# Patient Record
Sex: MALE | Race: AMERICAN INDIAN OR ALASKA NATIVE
[De-identification: names, ages, dates, MRNs, and addresses within clinical notes are randomized per-mention and may not be internally consistent; named-entity substitution may affect disease eponyms.]

---

## 2017-09-06 ENCOUNTER — HOSPITAL ENCOUNTER (EMERGENCY)
Dept: HOSPITAL 46 - ED | Age: 47
Discharge: HOME | End: 2017-09-06
Payer: COMMERCIAL

## 2017-09-06 VITALS — WEIGHT: 250 LBS | BODY MASS INDEX: 33.86 KG/M2 | HEIGHT: 72 IN

## 2017-09-06 DIAGNOSIS — S01.112A: Primary | ICD-10-CM
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
## 2017-09-06 PROCEDURE — 0HQ1XZZ REPAIR FACE SKIN, EXTERNAL APPROACH: ICD-10-PCS

## 2018-06-16 ENCOUNTER — HOSPITAL ENCOUNTER (INPATIENT)
Dept: HOSPITAL 46 - ED | Age: 48
LOS: 4 days | Discharge: HOME | DRG: 638 | End: 2018-06-20
Attending: INTERNAL MEDICINE | Admitting: INTERNAL MEDICINE
Payer: COMMERCIAL

## 2018-06-16 VITALS — WEIGHT: 225 LBS | HEIGHT: 75 IN | BODY MASS INDEX: 27.98 KG/M2

## 2018-06-16 DIAGNOSIS — F10.20: ICD-10-CM

## 2018-06-16 DIAGNOSIS — D69.6: ICD-10-CM

## 2018-06-16 DIAGNOSIS — Z79.2: ICD-10-CM

## 2018-06-16 DIAGNOSIS — E11.10: Primary | ICD-10-CM

## 2018-06-16 DIAGNOSIS — D64.9: ICD-10-CM

## 2018-06-16 DIAGNOSIS — Z79.899: ICD-10-CM

## 2018-06-16 DIAGNOSIS — B37.0: ICD-10-CM

## 2018-06-16 DIAGNOSIS — F17.200: ICD-10-CM

## 2018-06-16 DIAGNOSIS — Z79.84: ICD-10-CM

## 2018-06-16 DIAGNOSIS — K70.0: ICD-10-CM

## 2018-06-16 DIAGNOSIS — E87.6: ICD-10-CM

## 2018-06-16 DIAGNOSIS — E83.42: ICD-10-CM

## 2018-06-16 DIAGNOSIS — E78.2: ICD-10-CM

## 2018-06-16 PROCEDURE — G0480 DRUG TEST DEF 1-7 CLASSES: HCPCS

## 2018-06-17 NOTE — NUR
CALLED MD JIMÉNEZ TO UPDATE REGUARDING PTS LABS. LAB VAULES HAVE NOT CROSSED
OVER TO [facility] AT THIS TIME.  READ VALUES TO STAFF VIA PHONE. PER MD
WHEN BS <200 CHANGE IV FLUIDS TO D5NS W/20K AT 200MLS/HR. NO OTHER CHANGES AT
THIS TIME. WILL CONTINUE TO CLOSELY MONITOR.

## 2018-06-17 NOTE — NUR
PER ORDETS LEFT INSULINE GTT THE SAME RATE. PT COMPLAINS OF MILD
NAUSEA. UPDATED MD REGUARDING PT STATUS AND CURRENT LABS. WILL CONTINUE TO
CLOSELY MONITOR.

## 2018-06-17 NOTE — NUR
FSBS 196, INSULIN GTT TITRATED TO 11.2 UNITS/HR. PT C/O NAUSEA, NEST DOSE OF
ZOFRAN NOT DUE AT THIS TIME, PROIVER NOTIFIED, NEW ORDERS TO BE OBTAINED.

## 2018-06-17 NOTE — NUR
INSULIN GTT CONTINUES PER PROTOCOL. D5NS WITH 20 MEQ K CONTINUOUS 
ML/HR. LAC AND RAC IV SITE WNL, FLUSHING WELL, DRESSINGS INTACT. PT A&O X4,
SLOW TO RESPOND, FLAT AFFECT NOTED, BUT PLEASANT AND RECEPTIVE TO EDUCATION.
HR HAS REMAINED IN THE 70-80'S AND IN SR. LUNGS HAVE REMAIN CLEAR THROUGHOUT
BILATERALLY, OCCASIONAL COUGH WITH THIN GREEN SPUTUM. PT C/O NAUSEA W/O
EMESIS. PT ON CLEAR LIQUID SUGAR FREE DIET AND TOELERATING WELL. URINE OUTPUT
ADEQUATE, NO BM. PT C/O OF HA ON AND OFF THROUGHOUT SHIFT, PRN TYLENOL GIVEN
WITH RELIEF. OVERALL, PT RESTED MOST OF THE DAY, PT ENCOURAGED TO INCEASE
ACTIVITY AS TOLERATED, EDUCATION POVIDED THROUGHOUT SHIFT.

## 2018-06-17 NOTE — NUR
PT ARRIVED TO CCU VIA STRETCHER WITH FLOAT NURSE AND SIGNIFICANT OTHER AT
SIDE. PT IS ALERT AND ANSWERS QUESTIONS. PT ABLE TO TRANSFER FROM STRETCHER TO
BED ON HIS OWN. PT IS FORGETFUL PER REPORT.

## 2018-06-17 NOTE — NUR
FSBS 168, INSULIN GTT TITRATED TO 8.4 PER PROTOCOL. PT RESTING COMFORTABLY IN
BED, DENIES PAIN OR NAUSEA. PROVIDED EDUCATION REGARDING NEW LAB RESULTS.
ADVISED PT TO INCEASE ACTIVITY AS TOLERATED, PT AGREEABLE. CALL LIGHT WITHIN
REACH. WILL CONTINUE TO MONITOR.

## 2018-06-17 NOTE — NUR
PT SHIFT REPORT RECEIVED FROM DAY SHIFT RN. PT IS RESTING IN CHAIR AT THIS
TIME WITH HIS SO IN THE ROOM. PT IS ALERT AND ORIENTED AND MORE INVOLVED IN
CONVERSATIONS THAN THE PREVIOUS NIGHT. PT STATES "I FEEL MUCH BETTER THAN WHEN
I FIRST CAME IN". PT  TITRATED INSULIN GTT TO 9.6MLS/HR PER ORDERS. PT
DRINKING CLEAR LIQUID AT THIS TIME. WILL CONTINUE TO CLOSELY MONITOR.

## 2018-06-17 NOTE — NUR
FSBS 199, PER INSULIN PROTOCOL, INSULIN GTT TO REMAIN AT 7 UNITS/HR. PT
ASSESSED FOR HA AND NAUSEA. PT REPORTS IMPROVEMENT IN HA, RATES 2/10, AND
DENIES NAUSEA.

## 2018-06-17 NOTE — NUR
PT RESTING COMFORTABLY IN BED. FSBS 166, INSULIN DRIP TITRATED TO 7.2
UNITS/HR. D5NS WITH 20 MEQ K CONTINUES AT A RATE  ML/HR. NO NAUSEA OR
PAIN REPORTED AT THIS TIME.

## 2018-06-17 NOTE — NUR
HOURLY BS OBTAINED AND NOTED TO . PER INSULIN PROTOCOL, INSULIN GTT
TITRATED DOWN TO 4.8 UNITS/HOUR. WILL CONTINUE TO MONITOR AND TITRATE AS
NEEDED.

## 2018-06-17 NOTE — NUR
REPORT RC'D FROM NIGHT SHIFT. INSULIN GTT AND IV FLUIDS VERIFIED WITH NIGHT
SHIFT NURSE. D5NS WITH 20 MEQ K RUNNING  ML/HR AND INSULIN GTT RUNNING
AT 5.6 UNITS/HOUR. PT RESTING COMFORTABLY IN BED, NO DISTRESS NOTED, FSBS TO
BE OBTAINED.

## 2018-06-17 NOTE — NUR
FSBS 204, INSULIN GTT TITRATED TO 7 UNITS/HOUR, PER INSULIN PROTOCOL. PT C/O
HA 7/10 AND NAUSEA,  MG TYLENOL AND 4 MG ZOFRAN GIVEN, WILL REASSESS.

## 2018-06-17 NOTE — NUR
FSBS 158, INSULIN GTT TITRATED TO 6 UNITS/HR. D5NS 20 MEQ K CONTINUOUSLY
RUNNING  ML/HR. PT ASSISTED TO CHAIR, TOLERATED WELL, STATES IMPROVEMENT
IN DIZZINESS. PERSONAL HYGIENE ADDRESSED AND PT ASSISTED WITH ADLS. ORAL CARE
DISCUSSED. LINEN CHANGED. CALL LIGHT WITHIN REACH. WILL CONTINUE TO MONITOR.

## 2018-06-17 NOTE — NUR
INSULIN GTT DC'D PER ORDER. CONTINUOUS FLUIDS DC'D PER MD. BLOOD SUGAR CHECKED
AND SUB-Q INSULIN GIVEN. PT DIET INCREASED TO ADA DIET AND PT REQUESTED A
SMALL SNACK. GAVE SNACK AND FRECH WATER AND TEA AT BEDSIDE. PT DENIES ANY
OTHER NEEDS AT THIS TIME. PT REMAINS AWAKE AND IN CHAIR. PT CALLS
APPROPRIATELY AND HAS SO AT BEDSIDE. WILL CONTINUE TO CLOSELY MONITOR AT THIS
TIME.

## 2018-06-17 NOTE — NUR
PT  PER ORDER PARAMETERS CHANGED GTT TO 6.8MLS/HR. WILL RECHECK IN 1
HOUR. 2ND RN SANJEEV VERIFIED GTT RATE. PT DENIES ANY NEEDS AT THIS TIME. WILL
CONTINUE TO CLOSELY MONITOR.

## 2018-06-17 NOTE — NUR
PT CALLED TO USE URINAL. PT URINATE 550MLS. PT BLOOD SUGAR CHECKED 234 INSULIN
GTT CHANGED TO 5.1MLS/HR PER ORDERS. SANJEEV LEON VERIFIED. PT DENIES ANY OTHER
NEEDS AT THIS TIME. WILL CONTINUE TO CLOSELY MONITOR.

## 2018-06-17 NOTE — NUR
PER MD TRANSITION PT TO SUB Q INSULINE. GIVE SUB Q LANTUS AND TURN INSULIN GTT
OFF IN 1 HOUR. PT EDUCATED REGUARDING PLAN AND HAS NO FURTHER QUESTIONS. WILL
CONTINUE TO CLOSELY MONITOR.

## 2018-06-17 NOTE — NUR
ALL MEDICATIONS ADMINISTERED. PT IS RESTING IN BED AT THIS TIME.  PER
TITRATION SCALE TURNED GTT TO 9MLS/HR. WILL RECHECK IN 1 HOUR. PT DENIES ANY
OTHER NEEDS AT THIS TIME. BED ALARM REMAINS IN PLACE FOR PATIENT SAFETY. PT SO
IS STAYING OVERNIGHT. WILL CONTINUE TO CLOSELY MONITOR.

## 2018-06-17 NOTE — NUR
PT BLOOD SUGAR 368 TITRATED PER INSULIN GTT PER COLUMN SCALE PROTOCOL PER MDS
ORDERS. INSULIN GTT AT 6MLS/HR. PT ADMITTED AND RESTING IN BED AT THIS TIME.
PT ASSESSMENT COMPLETED. PT BREATH SOUNDS CLEAR. BOWEL TONES ACTIVE. PT DENIES
ABD PAIN AT THIS TIME. PT HAS GENERALIZED WEAKNESS. PT IS SLOW TO RESPOND AND
FORGETFUL. SO JAYNETTE AT BEDSIDE AND ANSWERD QUESTIONS PATIENT WAS NOT ABLE
TO ANSWER ON HIS OWN. EDUCATED BOTH REGUARDING PLAN OF CARE FOR THE NIGHT.
CALL LIGHT AT BEDSIDE. BED ALARM ON FOR SAFETY. URINAL AT BEDSIDE. WILL
CONTINUE TO CLOSELY MONITOR.

## 2018-06-18 NOTE — NUR
PT IN BED, FAMILY BY HIS SIDE. IMAGING HAD JUST LEFT, FELT HE DID NOT NEED TO
BE BOTHERED. JUST SAID HELLO, INTRO MYSELF, AND TOLD HIM HE HAS HAD ENOUGH OF
US FOR NOW-HE KIND OF KNODDED. HE REACHED OUT HIS HAND TO SHAKE MINE, WILL
FOLLOW AS NEEDED. GOD BLESS HIM

## 2018-06-18 NOTE — NUR
PT  GAVE SLIDING SCALE INSULIN. PT REQUESTING SUGAR FREE PUDDING FOR
SNACK. FRESH WATER AT BEDSIDE. PT DENIES ANY OTHER NEEDS AT THIS TIME. WILL
CONTINUE TO CLOSELY MONITOR.

## 2018-06-18 NOTE — NUR
DINNER TIME APPROACHING. THIS RN TO BEDSIDE FOR CBG CHECK AND TO DO PT
EDUCATION. THIS RN DEOMSTRATES FOR PT HOW TO CHECK BLOOD SUGAR. PT ASSISTS RN
WITH CHECK, DEOMONSTRATING UNDERSTANDING OF CLEANING FINGER, FINGER PRICK, AND
WIPING AWAY FIRST DROP OF BLOOD. BLOOD SUGAR = 287. PT WATCHING TV WAITING FOR
DINNER. CALL LIGTH WITHIN REACH. NO REQUESTS OR COMPLAINTS.

## 2018-06-18 NOTE — NUR
PT TRANSFERED FROM CCU, REPORT TAKEN FROM CAROLYN CONNER. PT RESTING IN CHAIR. FLAT
AFFECT NOTED. PT RESPONDS APPROPRIATLY TO QUESTIONS. PT REPORTS 7/10 PAIN IN
HIS LEFT SHOULDER. SEE MAR FOR MEDICATION GIVEN. ASSESSEMENT DONE. WATER
PROVIDED. MEDICATIONS GIVEN (SEE MAR). MAGNEISUM CONTINUES INFUSING VIA IV
PUMP. PT ORIENTED TO ROOM. PT DEMONSTRATES UNDERSTANDING OF HOW TO CALL THE
NURSE. PT STATES HE HAS NO ADDITIONAL QUESTIONS OR CONCERNS AT THIS TIME.

## 2018-06-18 NOTE — NUR
BEDSIDE REPORT RECEIVED FROM CAROLYN ULLOA. PT SITTING UP IN CHAIR, GIVEN BROTH
AS REQUESTED. IVS SALINE LOCKED. FAMILY AT BEDSIDE. PT HAS NO ADDL REQUESTS,
CALL LIGHT IN REACH.

## 2018-06-18 NOTE — NUR
********CARE CONFERENCE************
ATTENDEES:  PT, PT LIFE PARTNER, EVON VENTURA AND MOIRA JACKSON,
            FROM [facility].
STAFF: MYSELF CASE MANAGEMENT, LAILA RN
DISCUSSION WAS HAD WITH THE PT REGARDING SOME OF THE THINGS TO EXPECT WHILE
HERE IN THE HOSPITAL--EDUCATION REGARDING CHECKING BLOOD SUGARS, TEACHING AND
REGULATING DIETARY NEEDS, ETC.  [facility] NURSES STATED WHEN HE IS
DC'D TO STOP AND  A GLUCOSE MONITOR, AND THEY WOULD BE ABLE TO BE THERE
TO HELP WITH EDUCATION, AS WHEN ITS FIRST GIVEN YOU TEND TO FOR GET ABOUT SOME
OF THE DETAILS.  PT AND LIFE PARTNER VERY AGREEABLE ABOUT THIS.  PT DID
MENTION HE HAD TRIED TO GET INTO DETOX THIS WEEK, BUT STATES HE CHANGED HIS
MIND AND DID NOT GO INTO DETOX--STATES HE IS DETOXING FROM ALCOHOL.  DENIES
ANY DRUG USEAGE.  VERY OPEN TO LEARN.

## 2018-06-18 NOTE — NUR
ROUNDED AS CHARGE. PATIENT IS RESTING IN RECLINER. CNA IN THE ROOM ASSISTING
WITH PREPPING PATIENT FOR A SHOWER. PATIENT DENIES ANY COMMENTS, QUESTIONS, OR
CONCERNS. NO FURTHER NEEDS AT THIS TIME.

## 2018-06-18 NOTE — NUR
REPORT RC'JAILENE SANDS NIGHT SHIFT NURSE. PT RESTING COMFORTABLY IN BED, S/O AT
BEDSIDE. PT IS SALINE LOCKED AT THIS TIME. ADA BREAKFAST AT BEDSIDE. MORNING
ASSESSMENT TO BE COMPLETED.

## 2018-06-18 NOTE — NUR
BREAKFAST ORDERED. PT RESTING IN BED AT THIS TIME. URINAL EMPTIED. PT DENIES
ANY NEEDS AT THIS TIME. WILL CONTINUE TO CLOSELY MONITOR.

## 2018-06-18 NOTE — NUR
PT CALLED TO GET UP TO GO TO BED. PT STOOD AND TRANSFERED ON HIS OWN WITH NO
ISSUES. VITALS TAKEN AND FRESH WATER AT BEDSIDE. PT DENIES ANY OTHER NEEDS AT
THIS TIME. WILL CONTINUE TO CLOSELY MONITOR.

## 2018-06-18 NOTE — NUR
PT ASSESSMENT COMPLETE. , PT ABLE TO CHECK OWN BLOOD SUGAR, ADMINISTER
INSULIN, EDUCATION PROVIDED. PT OUT OF SHOWER, IVS SALINE LOCKED. LUNGS CLEAR
THROUGHOUT ALL LOBES. BOWEL TONES ACTIVE X 4, ABD SOFT, PT DENIES NAUSEA. CSM
INTACT BUE, BLE, ALMA HOSE IN PLACE. PT UP IN CHAIR, GIVEN TEA, ICE WATER,
BROTH AS REQUESTED. CALL LIGHT IN REACH.

## 2018-06-18 NOTE — NUR
PATIENT WANTS SHOWER. STANDBY ASSIST, INDEPENDENT TAKING SHOWER PER CAROLYN SIMENTAL. WRAPPED LEFT AND RIGHT ARM IV SITE.

## 2018-06-18 NOTE — NUR
RC'D ORDER FOR ABD U/S, PT TO BE NPO AT THIS TIME. PT ADVISED OF TESTING AND
NPO STATUS, PT AGREEABLE, ALL QUESTIONS ANSWERED AT THIS TIME.

## 2018-06-18 NOTE — NUR
PT ARRIVED FROM CCU TODAY FOR DKA. MONITORING BLOOD SUGARS. TEACHING PT TO
TAKE HIS OWN BLOOD SUGAR AND GIVE HIS OWN INSULIN. TYLENOL GIVEN FOR SHOULDER
PAIN. IV MAGNESIUM GIVEN TODAY. CARE CONFERENCE WITH FLOR AND YELLOW HAWK
TOADY. ABDOMINAL ULTRASOUND TODAY. 1 PERSON SBA, PT USES CALL LIGHT
INCONSISTANTLY.

## 2018-06-18 NOTE — NUR
afternoon assessment and medications due. THIS RN TO BEDSIDE. PT UP TO CHAIR.
PT DENIES PAIN AND NAUSEA. ASSESSMENT DONE. MEDICATION GIVEN (SEE MAR). PT
REQUESTS COFFEE AND BROTH. GIVEN AS REQUESTED. PT STATES HE HAS NO ADDITIONAL
REQUESTS OR COMPLAINTS. SIGNIFICANT OTHER AT CHAIRSIDE. CALL LIGHT WITHIN
REACH.

## 2018-06-18 NOTE — NUR
PHONE CALL TO [facility] TO SPEAK WITH COMMUNITY [facility]
NURSE. SPOKE WITH MOIRA MARIA PT AND UPDATING PT'S PRIMARY CARE
PROVIDER'S TEAM. GIVEN THE NEW ONSET OF DM AND RECENT HOSPITALIZATION WITH
DKA, [facility] NURSES WILL BE IN FOR A CARE CONFERENCE AT 2 PM TODAY.

## 2018-06-18 NOTE — NUR
PT URINATED IN URINAL. PT CALLED TO HAVE IT EMPTIED. PT REQUESTING FRESH
WATER. PT IS SLEEPING OFF AND ON TONIGHT. WILL CONTINUE TO ENCOURAGE REST AT
THIS TIME. WILL CLOSELY MONITOR.

## 2018-06-19 NOTE — NUR
CHECKED ON PT, APPEARS TO BE SLEEPING, EYES CLOSED, TV AND LIGHTS OFF IN ROOM,
VISIBLE CHEST RISE, NON-LABORED BREATHING.

## 2018-06-19 NOTE — NUR
CHECKED ON PT, AWAKE, SITTING UP IN BED. PT HAD LARGE FORMED BROWN BM. GIVEN
BROTH AS REQUESTED. CALL LIGHT IN REACH. NO ADDL REQUESTS.

## 2018-06-19 NOTE — NUR
PT ABLE TO SELF CHECK BLOOD SUGARS, ADMINISTER OWN INSULIN X 2 WITH
INSTRUCTION. PT AWAKE FOR MOST OF NIGHT, SNACKING, ADA DIET. IVS SALINE LOCKED
WNL. PT USING CALL LIGHT APPROPRIATELY. BOWEL PREP INITIATED AS LAST KNOWN BM
6/15.

## 2018-06-19 NOTE — NUR
MEDICATIONS DUE. THIS RN TO BEDSIDE. PT WATCHING TV. MEDICATIONS GIVEN AS
ORDERED (SEE MAR). TEA PROVIDED PER PT REQUESTS. BED RAILS UP. CALL LIGHT
WITHIN REACH.

## 2018-06-19 NOTE — NUR
PT HERE FOR DKA, PT LEARNING TO GIVE HIS OWN INSULIN AND CHECK CBGS. SLIDING
SCALE AND BASELINE INSULIN DOESES INCREASED TODAY. PT EDUCATION R/T FOOD
CHOICES DISCUSSED TODAY. NUTRITIONIST CONSULT SCHEDULED FOR TOMORROW. PT USING
CALL LIGHT APPROPRITALY.

## 2018-06-19 NOTE — NUR
BEDSIDE REPORT RECEIVED FROM CAROLYN ULLOA. PT AWAKE, SITTING UP IN BED. IV SITES
SALINE LOCKED. PT GIVEN BROTH AS REQUESTED. URINAL EMPTIED. CALL LIGHT IN
REACH. NO ADDL REQUESTS AT THIS TIME.

## 2018-06-19 NOTE — NUR
PT ASSESSMENT COMPLETE. , ACCU CHECK AND SS INSULIN ADMINISTERED BY PT.
LUNGS CLEAR THROUGHOUT ALL LOBES, BOWEL TONES ACTIVE X 4, PT DENIES PAIN,
DENIES NAUSEA. URINAL EMPTIED. PT GIVEN BROTH, SUGAR FREE JELLO, DECAF TEA.
CALL LIGHT IN REACH.

## 2018-06-19 NOTE — NUR
AFTERNOON ASSESSMENT AND MEDICATION DUE. THIS RN TO BEDSIDE. PT UP IN BED,
SIGNIFICANT OTHER COMBING PTS HAIR. PT STATES HE IS STILL "A LITTLE"
NAUSEATED. PT AGREEABLE TO MEDICATION FOR NAUSEA. ASSESSMENT DONE. MEDICATION
GIVEN (SEE MAR). PT ASSISTED IN TAKING HIS OWN BLOOD SUGAR. PT WATCHING MOVIE,
BROTH AND TEA PROVIDED. NO ADDITIONAL REQUESTS OR COMPLAINTS. BED RAILSUP.
CALL LIGHT WITHIN REACH.

## 2018-06-19 NOTE — NUR
CALL LIGHT ANSWERED, PT'S URINAL EMPTIED, SBA TO RESTROOM FOR VOID, GAIT
UNSTEADY, PT REFUSES WALKER. PT GIVEN GLUCERNA DIABETIC DRINK, SUGAR FREE
PUDDING AND SUGAR FREE JELLO AS REQUESTED. CALL LIGHT AND PERSONAL SUPPLIES IN
REACH.

## 2018-06-19 NOTE — NUR
BREAKFAST ARRIVED. MEDICATIONS GIVEN AS ORDERED (SEE MAR). PT GAVE HIS OWN
INSULIN. CORRECT TECHNIQUE NOTED BY THIS RN. PT FINISHING BREAKFAST. NO
REQUESTS OR COMPLAINTS. BED RAILS UP. CALL LIGHT WITHIN REACH.

## 2018-06-19 NOTE — NUR
PT SITTING UP IN BED. RATHER FLAT AFFECT, BUT POLITE AND RESPONDS TO QUESTIONS
POSED TO HIM. PT ADMITTED THAT HE IS STILL KIND OF TRYING TO ASSESS THE FULL
IMPLICATIONS OF WHAT HE CALLED HIS "NEW" DIAGNOSIS. A VERY ENGAGING AND GOOD
CONVERSATION WITH PT, HE REQUESTED PRAYER. WILL FOLLOW AS NEEDED

## 2018-06-19 NOTE — NUR
DINNER ARRIVED. INSULIN DUE. PT ADMINISTERS HIS OWN INSULIN WITH GOOD
TECHNIQUE. PT REQUESTS A VEGETABLE TRAY. ORDER CALLED TO DIETARY. NO
ADDITIONAL REQUESTS OR COMPLAINTS. BED RAILS UP CALL LIGHT WITHIN REACH.

## 2018-06-19 NOTE — NUR
PT ASSESSMENT COMPLETE. PT DENIES NAUSEA, BOWEL TONES ACTIVE X 4, ABD SOFT,
NON-TENDER. , PT ABLE TO DRAW UP OWN SS INSULIN, EDUCATION PROVIDED.
SS AND SCHEDULED INSULIN ADMINISTERED BY PT, SIGNIFICANT OTHER ALSO IN ROOM
FOR EDUCATION. LUNGS CLEAR THROUGHOUT ALL LOBES, SBA TO RESTROOM FOR VOID. PT
BACK IN BED. CSM INTACT. CALL LIGHT AND PERSONAL SUPPLIES IN REACH.

## 2018-06-19 NOTE — NUR
MEDICATION AND CBG DUE. THIS RN TO BEDSIDE. PT RESTING WITH EYES CLOSED,
AWAKENS TO VOICE. PT ASSISTED TO TAKE HIS OWN BLOOD SUGAR. PT DEMONSTARTES
PROPER TECHNIQUE. MEDICATION GIVEN (SEE MAR). ASSESSMENT DONE. PT DRIFTS BACK
TO SLEEP. RR = 16 BPM. URINAL EMPTIED. BED RAILS UP. CALL LIGHT WITHIN REACH.

## 2018-06-19 NOTE — NUR
MD NOTIFIED OF PTS BLOOD SUGAR, NAUSEA, AND DIZZINESS. NO ACTION NEEDED AT
THIS TIME. CONTINUE TO OBSERVE AND LIMIT CARBOHYDRATE/SUGAR DIET CHOICES.
ADDITONAL SOUP AND CHEESE ORDERED FOR PTS LUNCH. PT EDUCATION REGARDING JUICE
DONE. PT VERBALIZES UNDERSTANDING AND STATES HE WILL DRINK TEA INSTEAD OF
JUICE. JUICE REMOVED FROM ROOM. TEA WITH TRUVIA PROVIDED AS REQUESTED. BED
RAILS UP. CALL LIGHT WITHIN REACH.

## 2018-06-19 NOTE — NUR
IN PT ROOM FOR MED ADMIN, PT UP IN CHAIR, GIVEN ADDL SUGAR FREE PUDDING, ICE
WATER AS REQUESTED. PT DENIES TRANSFERRING TO BED AT THIS TIME, WATCHING TV.
CALL LIGHT IN REACH.

## 2018-06-19 NOTE — NUR
NOON CBG, INSULIN AND FOCUSSED ASSESSMENT DUE. THIS RN TO BEDSIDE. CNA DOING
BLOOD SUGAR WITH PT. PT DEMONSTRATES CORRECT TECHNIQUE. PT GIVES INSULIN TO
SELF CORRECTLY. THIS RN NOTES THAT PT HAS ORANGE JUICE ORDERED FOR LUNCH.
EDUCATION DONE R/T HIGH SUGAR FOODS THAT CAN SPKIE BLOOD SUGAR. PT
ACKNOWLEDGES EDUCATION AND STATES HE WANTS JUICE "OF SOME KINDE." PT AGREEABLE
TO LIGHT CRANBERRY JUICE, GIVEN AS REQUETED WITH ICE. PT REPORTS NAUSEA AND "A
LITTLE DIZZINESS" WHEN HE GETS UP TO USE RESTROOM. PT REFUSES NAUSEA
MEDICATION AT THIS TIME. PT EATING LUNCH. BED RAILS UP. CALL LIGHT WITHIN
REACH.

## 2018-06-19 NOTE — NUR
MEDICATION DUE. THIS RN TO BEDSIDE. PT WATCHING TV. REQUESTS TEA AND BROTH TO
DRINK/EAT. MEDICATION GIVEN AS ORDERED. PT DENIES TROUBLE WITH ACID REFLUX AND
STATES HE DOES NOT NEED MAALOX AT THIS TIME. TEA AND BROTH GIVEN AS REQUESTED.
NO ADDITIONAL REQUESTS OR COMPLAINTS. BED RAILSUP. CALL LIGHT WITHIN REACH.

## 2018-06-20 NOTE — NUR
CALL LIGHT ANSWERED, PT GIVEN DECAF TEA, ICE WATER AS REQUESTED. MEDICATIONS
ADMINISTERED AT THIS TIME. URINAL EMPTIED. IS AND OS COMPLETE. MILTON SHABAZZ NOW
IN ROOM FOR VITALS. PT HAS NO ADDL REQUESTS. CALL LIGHT IN REACH.

## 2018-06-20 NOTE — NUR
BM THIS SHIFT. SBA. PT ABLE TO CHECK OWN BLOOD SUGARS, DRAW UP INSULIN DOSE
AND ADMINISTER WITH MINIMAL INSTRUCTION, SIGNIFICANT OTHER ALSO PROVIDED WITH
EDUCATION. ADA DIET. IV SITE SALINE LOCKED WNL. USING CALL LIGHT
APPROPRIATELY.

## 2018-06-20 NOTE — NUR
PT RECIEVING EXTENSIVE EDUCATION FROM ANAI IN PHARMACY ON NEW MEDICATION,
DIABETES INFO FROM DIABETES EDUCATOR RITIKA, AND NUTRITION INFO FROM
SUE DIETICIAN.

## 2018-06-20 NOTE — NUR
PT UP IN RECLINER EATING LUNCH. ADMINISTERED LUNCH TIME INSULIN INDEPENDENTLY.
BOTH IV'S DC'D BY PREET ROSE, CATH TIP INTACT, SITES WITHOUT REDNESS OR OTHER
SIGNS OF INFECTION. PT STATES HE WOULD LIKE TO SHOWER AFTER HE EATS, SHOWER
SET UP. CALL LIGHT WITHIN REACH.

## 2018-06-20 NOTE — NUR
PT SHOWERED INDEPENDENTLY. PT DRESSED IN PERSONAL CLOTHING. SITTING UP IN
CHAIR WAITING FOR RIDE TO GET HERE. CALL LIGHT WITHIN REACH.

## 2018-06-20 NOTE — NUR
THIS RN OBSERVED PT OBTAIN BLOOD SUGAR INDEPENDENTLY. PT WAS ABLE TO DETERMINE
APPRORIATE AMOUNT OF INSULIN NEEDED BASED ON ORDERS AND SLIDING SCALE. PREPPED
VIAL AND SKIN, KEDAR UP MEDS, AND INJECTED INSULIN APPROPRIATELY.
PT DENIES PAIN OR OTHER CONCERNS THIS AM. ALERT AND ORIENTED. PT SITTING UP
AND EATING BREAKFAST AFTER SELF ADMINISTERING INSULIN. THIS RN PROVIDED SBA TO
PT TO RESTROOM PRIOR TO MED PASS. PT VOIDED, BRUSHED TEETH, AND WASHED FACE.
AMB BACK TO BED. CALL BUTTON WITHIN REACH.

## 2022-10-28 NOTE — NUR
AT THIS TIME, ACCU CHECK AND SS INSULIN ADMINISTRATION COMPLETED BY
PT. PT ASSESSMENT COMPLETE. DENIES NAUSEA. BOWEL TONES ACTIVE X 4, ABD SOFT.
LUNGS CLEAR THROUGHOUT ALL LOBES, HR REGULAR RHYTHM. CSM INTACT BUE, BLE. PT
HAD LIQUID BM, URINAL EMPTIED. CALL LIGHT IN REACH, LIGHTS OFF IN ROOM. PT
WATCHING MOVING ON CELL PHONE. GIVEN DECAF TEA AS REQUESTED. 2020 will obtain